# Patient Record
Sex: MALE | Race: BLACK OR AFRICAN AMERICAN | NOT HISPANIC OR LATINO | Employment: OTHER | URBAN - METROPOLITAN AREA
[De-identification: names, ages, dates, MRNs, and addresses within clinical notes are randomized per-mention and may not be internally consistent; named-entity substitution may affect disease eponyms.]

---

## 2021-04-21 DIAGNOSIS — Z12.11 COLON CANCER SCREENING: ICD-10-CM

## 2021-04-21 DIAGNOSIS — Z11.59 NEED FOR HEPATITIS C SCREENING TEST: ICD-10-CM

## 2021-08-25 PROBLEM — S62.367A CLOSED NONDISPLACED FRACTURE OF NECK OF FIFTH METACARPAL BONE OF LEFT HAND: Status: ACTIVE | Noted: 2021-08-25

## 2021-08-25 PROBLEM — S62.325A CLOSED DISPLACED FRACTURE OF SHAFT OF FOURTH METACARPAL BONE OF LEFT HAND: Status: ACTIVE | Noted: 2021-08-25

## 2021-09-04 ENCOUNTER — SPECIALTY PHARMACY (OUTPATIENT)
Dept: PHARMACY | Facility: CLINIC | Age: 76
End: 2021-09-04

## 2021-09-14 ENCOUNTER — SPECIALTY PHARMACY (OUTPATIENT)
Dept: PHARMACY | Facility: CLINIC | Age: 76
End: 2021-09-14

## 2021-09-24 ENCOUNTER — SPECIALTY PHARMACY (OUTPATIENT)
Dept: PHARMACY | Facility: CLINIC | Age: 76
End: 2021-09-24

## 2021-10-07 ENCOUNTER — SPECIALTY PHARMACY (OUTPATIENT)
Dept: PHARMACY | Facility: CLINIC | Age: 76
End: 2021-10-07

## 2021-10-25 PROBLEM — J44.9 CHRONIC OBSTRUCTIVE PULMONARY DISEASE: Status: ACTIVE | Noted: 2021-10-25

## 2021-10-25 PROBLEM — R05.9 COUGH: Status: ACTIVE | Noted: 2021-10-25

## 2021-10-25 PROBLEM — R91.1 SOLITARY PULMONARY NODULE: Status: ACTIVE | Noted: 2021-10-25

## 2021-11-05 ENCOUNTER — SPECIALTY PHARMACY (OUTPATIENT)
Dept: PHARMACY | Facility: CLINIC | Age: 76
End: 2021-11-05
Payer: MEDICARE

## 2021-11-05 PROBLEM — R73.03 PRE-DIABETES: Status: ACTIVE | Noted: 2021-11-05

## 2021-11-05 PROBLEM — I10 PRIMARY HYPERTENSION: Status: ACTIVE | Noted: 2021-11-05

## 2021-11-09 ENCOUNTER — SPECIALTY PHARMACY (OUTPATIENT)
Dept: PHARMACY | Facility: CLINIC | Age: 76
End: 2021-11-09
Payer: MEDICARE

## 2021-11-09 PROBLEM — Z87.891 FORMER SMOKER: Status: ACTIVE | Noted: 2021-11-09

## 2021-11-09 PROBLEM — R91.1 SOLITARY PULMONARY NODULE: Chronic | Status: ACTIVE | Noted: 2021-10-25

## 2021-11-09 PROBLEM — Z87.891 FORMER SMOKER: Chronic | Status: ACTIVE | Noted: 2021-11-09

## 2021-11-09 PROBLEM — J44.9 CHRONIC OBSTRUCTIVE PULMONARY DISEASE: Chronic | Status: ACTIVE | Noted: 2021-10-25

## 2022-02-09 PROBLEM — C34.91 ADENOCARCINOMA OF RIGHT LUNG, STAGE 1: Status: ACTIVE | Noted: 2022-02-09

## 2022-02-25 PROBLEM — G89.29 CHRONIC LOW BACK PAIN: Status: ACTIVE | Noted: 2022-02-25

## 2022-02-25 PROBLEM — M54.50 CHRONIC LOW BACK PAIN: Status: ACTIVE | Noted: 2022-02-25

## 2022-03-14 PROBLEM — Z90.2 S/P LOBECTOMY OF LUNG: Status: ACTIVE | Noted: 2022-03-14

## 2022-03-14 PROBLEM — E78.5 HLD (HYPERLIPIDEMIA): Status: ACTIVE | Noted: 2022-03-14

## 2022-03-16 PROBLEM — R41.0 DELIRIUM: Status: ACTIVE | Noted: 2022-03-16

## 2022-03-28 PROBLEM — E66.811 CLASS 1 OBESITY DUE TO EXCESS CALORIES WITH SERIOUS COMORBIDITY AND BODY MASS INDEX (BMI) OF 32.0 TO 32.9 IN ADULT: Status: ACTIVE | Noted: 2022-03-28

## 2022-03-28 PROBLEM — E66.09 CLASS 1 OBESITY DUE TO EXCESS CALORIES WITH SERIOUS COMORBIDITY AND BODY MASS INDEX (BMI) OF 32.0 TO 32.9 IN ADULT: Status: ACTIVE | Noted: 2022-03-28

## 2022-04-05 PROBLEM — R41.0 DELIRIUM: Status: RESOLVED | Noted: 2022-03-16 | Resolved: 2022-04-05
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
## 2022-08-31 PROBLEM — M62.830 BACK SPASM: Status: ACTIVE | Noted: 2022-08-31

## 2022-09-23 PROBLEM — J30.89 ENVIRONMENTAL AND SEASONAL ALLERGIES: Status: ACTIVE | Noted: 2022-09-23

## 2023-06-05 PROBLEM — J41.0 SIMPLE CHRONIC BRONCHITIS: Chronic | Status: ACTIVE | Noted: 2021-10-25

## 2023-08-11 PROBLEM — H40.9 GLAUCOMA: Status: ACTIVE | Noted: 2023-08-11

## 2024-02-03 PROBLEM — H52.7 REFRACTIVE ERROR: Status: ACTIVE | Noted: 2024-02-03

## 2024-02-12 PROBLEM — K76.9 HEPATIC LESION: Status: ACTIVE | Noted: 2024-02-12

## 2024-08-07 PROBLEM — E66.811 CLASS 1 OBESITY DUE TO EXCESS CALORIES WITH SERIOUS COMORBIDITY AND BODY MASS INDEX (BMI) OF 32.0 TO 32.9 IN ADULT: Status: RESOLVED | Noted: 2022-03-28 | Resolved: 2024-08-07

## 2024-08-07 PROBLEM — E66.09 CLASS 1 OBESITY DUE TO EXCESS CALORIES WITH SERIOUS COMORBIDITY AND BODY MASS INDEX (BMI) OF 32.0 TO 32.9 IN ADULT: Status: RESOLVED | Noted: 2022-03-28 | Resolved: 2024-08-07

## 2024-09-20 PROBLEM — Z85.118 HISTORY OF ADENOCARCINOMA OF LUNG: Status: ACTIVE | Noted: 2024-09-20

## 2024-09-20 PROBLEM — R59.0 ANTERIOR MEDIASTINAL LYMPHADENOPATHY: Status: ACTIVE | Noted: 2024-09-20

## 2024-09-24 ENCOUNTER — PATIENT MESSAGE (OUTPATIENT)
Dept: FAMILY MEDICINE | Facility: CLINIC | Age: 79
End: 2024-09-24
Payer: MEDICARE

## 2024-10-24 PROBLEM — Z51.11 ENCOUNTER FOR ANTINEOPLASTIC CHEMOTHERAPY AND IMMUNOTHERAPY: Status: ACTIVE | Noted: 2024-10-24

## 2024-10-24 PROBLEM — Z51.12 ENCOUNTER FOR ANTINEOPLASTIC CHEMOTHERAPY AND IMMUNOTHERAPY: Status: ACTIVE | Noted: 2024-10-24

## 2024-10-25 ENCOUNTER — OUTPATIENT CASE MANAGEMENT (OUTPATIENT)
Dept: ADMINISTRATIVE | Facility: OTHER | Age: 79
End: 2024-10-25
Payer: MEDICARE

## 2024-10-25 NOTE — PROGRESS NOTES
Received a secure chat message from MD reporting that pt will start chemo soon and pt's daughter (Isela Jimenez) is interested in what services can be provided to pt to receive help at home and/or if pt will need to transition to assisted care facility for assistance; Sw called pt at [phone]; no response via phone; left message and contact information for a return call; Sw at that time called pt's daughter at [phone]; no response via phone; left message and contact information for a return call; Sw will continue to follow pt to assist with needs and concerns.    MARKEL Sanders    Ext. [phone]  Pager #[phone]

## 2024-10-25 NOTE — PROGRESS NOTES
Received Sw consult on pt, but no need was listed; Sw sent a secure chat message to MD requesting services needed for pt; Sw will continue to follow pt to assist with needs and concerns.    MARKEL Sanders    Ext. [phone]  Pager #[phone]

## 2024-10-25 NOTE — PROGRESS NOTES
Received a call from pt's daughter (Isela Jimenez) and she was informed of message received from MD and pt's daughter stated that pt lives alone and is concerned if pt will be able to care for himself if he has any issues once he starts chemo tx's; Sw informed pt's daughter about Long Term Personal Care Services Program ([facility]); pt daughter verbalized understanding and feel program would be good for pt if approved; Sw informed pt's daughter that information for [facility] will be emailed to her to review services; pt's daughter provided her email address and information was sent to her; Sw advised pt's daughter to call if she need assistance; no other needs were identified at that time; Sw will continue to follow pt to assist with needs and concerns.    MARKEL Sanders    Ext. [phone]  Pager #[phone]

## 2024-11-06 PROBLEM — J44.9 CHRONIC OBSTRUCTIVE PULMONARY DISEASE: Status: ACTIVE | Noted: 2024-11-06

## 2024-12-12 ENCOUNTER — OUTPATIENT CASE MANAGEMENT (OUTPATIENT)
Dept: ADMINISTRATIVE | Facility: OTHER | Age: 79
End: 2024-12-12
Payer: MEDICARE

## 2024-12-12 NOTE — PROGRESS NOTES
Was asked by MD to speak with pt and pt's daughter(Isela Jimenez) who was on the phone regarding some type of assistance that the prior Sw was working on. Met with pt in the exam room and spoke with pt daughter(Isela Jimenez) who was on the phone. Pt daughter reported that Sw was talking with pt about assistance that he can receive in the home with his household chores and pt did not want to receive the assistance at that time but as since changed his mind now that he is receiving treatment. Pt daughter reported that pt needs assistance around the house as she lives in [place]. Pt daughter reported they called and applied for the Medicaid waiver program last month but have not heard anything from them. Suggested to pt daughter they call and follow up on the status of pt application. Pt daughter also inquired about the Meals on Wheels program. Informed pt that the Meals on Wheels program is provided by the [employer] [facility] [employer] and Sw can provide that information as well. Pt daughter asked if Sw can email information and she provided her email address. Emailed resources information to pt daughter per her request. No other needs were noted at that time. Will continue to follow and assist as needed.       Kaci Chambers, MAXINE  Outpatient Case Management Social Worker  Ext [phone]

## 2024-12-16 PROBLEM — M79.604 PAIN IN BOTH LOWER EXTREMITIES: Status: ACTIVE | Noted: 2024-12-16

## 2024-12-16 PROBLEM — R11.0 NAUSEA: Status: ACTIVE | Noted: 2024-12-16

## 2024-12-16 PROBLEM — R53.81 PHYSICAL DECONDITIONING: Status: ACTIVE | Noted: 2024-12-16

## 2024-12-16 PROBLEM — M79.605 PAIN IN BOTH LOWER EXTREMITIES: Status: ACTIVE | Noted: 2024-12-16

## 2024-12-16 PROBLEM — G89.3 CHRONIC PAIN AFTER CANCER TREATMENT: Status: ACTIVE | Noted: 2024-12-16

## 2024-12-19 PROBLEM — C41.2: Status: ACTIVE | Noted: 2024-12-19

## 2024-12-20 PROBLEM — R13.10 DYSPHAGIA: Status: ACTIVE | Noted: 2024-12-20

## 2024-12-24 PROBLEM — R29.898 WEAKNESS OF BOTH LOWER EXTREMITIES: Status: ACTIVE | Noted: 2024-12-24

## 2024-12-29 PROBLEM — R53.1 GENERALIZED WEAKNESS: Status: ACTIVE | Noted: 2024-12-29

## 2024-12-31 PROBLEM — C34.91: Status: ACTIVE | Noted: 2024-12-31

## 2024-12-31 PROBLEM — G89.3 CANCER ASSOCIATED PAIN: Status: ACTIVE | Noted: 2024-12-31

## 2024-12-31 PROBLEM — C34.90 PRIMARY MALIGNANT NEOPLASM OF LUNG METASTATIC TO OTHER SITE: Status: ACTIVE | Noted: 2022-02-09

## 2025-01-12 PROBLEM — E87.1 HYPONATREMIA: Status: ACTIVE | Noted: 2025-01-12

## 2025-02-27 ENCOUNTER — OUTPATIENT CASE MANAGEMENT (OUTPATIENT)
Dept: ADMINISTRATIVE | Facility: OTHER | Age: 80
End: 2025-02-27
Payer: MEDICAID

## 2025-02-27 NOTE — PROGRESS NOTES
Informed by MD to assist pt with transportation concerns; Sw later received office visit from pt regarding assistance with completing [employer] application; Sw assisted pt with completing patient portion of application and spoke with MD and obtained completed portion for healthcare provider; Sw at that time provided pt with completed application and pt stated that he would turn in application to [employer] in person for assistance; pt at that time contacted his daughter via phone regarding application and pt put his daughter on speaker phone and pt's daughter stated that pt also need assistance around the house with light housekeeping and laundry; Sw informed pt's daughter about [facility]; pt's daughter verbalized understanding and was told that she would be emailed flier for review and pt would also be provided with information; Sw reviewed pt's daughter email address and information was sent for [facility] via email; Sw advised pt to call if he need additional assistance; no other needs were identified at that time; Sw will continue to follow pt to assist with needs and concerns.    MARKEL Sanders    Ext. [phone]  Pager #[phone]

## 2025-03-10 ENCOUNTER — OUTPATIENT CASE MANAGEMENT (OUTPATIENT)
Dept: ADMINISTRATIVE | Facility: OTHER | Age: 80
End: 2025-03-10
Payer: MEDICARE

## 2025-03-10 NOTE — PROGRESS NOTES
Called pt at [phone] to follow-up with him turning in [employer] application and to find out if his daughter (Isela Jimenez) contacted [facility] on behalf of pt; no response via phone; left message and contact information for a return call; Sw will continue to follow pt to assist with needs and concerns.    MARKEL Sanders    Ext. [phone]  Pager #[phone]

## 2025-04-07 ENCOUNTER — EXTERNAL HOME HEALTH (OUTPATIENT)
Dept: HOME HEALTH SERVICES | Facility: HOSPITAL | Age: 80
End: 2025-04-07
Payer: MEDICARE